# Patient Record
Sex: MALE | Race: WHITE | NOT HISPANIC OR LATINO | Employment: PART TIME | URBAN - METROPOLITAN AREA
[De-identification: names, ages, dates, MRNs, and addresses within clinical notes are randomized per-mention and may not be internally consistent; named-entity substitution may affect disease eponyms.]

---

## 2022-08-21 ENCOUNTER — HOSPITAL ENCOUNTER (EMERGENCY)
Facility: HOSPITAL | Age: 37
Discharge: HOME | End: 2022-08-21
Attending: EMERGENCY MEDICINE
Payer: MEDICAID

## 2022-08-21 VITALS
RESPIRATION RATE: 18 BRPM | WEIGHT: 155 LBS | HEART RATE: 72 BPM | OXYGEN SATURATION: 98 % | DIASTOLIC BLOOD PRESSURE: 82 MMHG | TEMPERATURE: 98 F | SYSTOLIC BLOOD PRESSURE: 124 MMHG

## 2022-08-21 DIAGNOSIS — Z87.442 HISTORY OF KIDNEY STONES: ICD-10-CM

## 2022-08-21 DIAGNOSIS — R10.9 FLANK PAIN: Primary | ICD-10-CM

## 2022-08-21 LAB
BILIRUB UR QL STRIP: NEGATIVE
CLARITY UR: CLEAR
COLOR UR: YELLOW
GLUCOSE UR QL STRIP: NEGATIVE
HGB UR QL STRIP: NEGATIVE
KETONES UR QL STRIP: NEGATIVE
LEUKOCYTE ESTERASE UR QL STRIP: NEGATIVE
NITRITE UR QL STRIP: NEGATIVE
PH UR STRIP: 7 [PH] (ref 5–8)
PROT UR QL STRIP: NEGATIVE
SP GR UR STRIP: 1.02 (ref 1–1.03)
URN SPEC COLLECT METH UR: NORMAL
UROBILINOGEN UR STRIP-ACNC: NEGATIVE EU/DL

## 2022-08-21 PROCEDURE — 99282 EMERGENCY DEPT VISIT SF MDM: CPT

## 2022-08-21 PROCEDURE — 81003 URINALYSIS AUTO W/O SCOPE: CPT | Performed by: NURSE PRACTITIONER

## 2022-08-21 RX ORDER — TAMSULOSIN HYDROCHLORIDE 0.4 MG/1
0.4 CAPSULE ORAL DAILY
Qty: 10 CAPSULE | Refills: 0 | Status: SHIPPED | OUTPATIENT
Start: 2022-08-21 | End: 2023-08-21

## 2022-08-21 NOTE — ED NOTES
At the beginning of the triage process when pt was pulled from front lobby pt was hunched over rocking in pain.  Pt was bracing left lower abdomen and towards end of triage stated he thought he passed the stone, stating a prior hx.  Pt used urinal and specimen sent to lab.  Pt now stating no pain and requesting dc after tests.  erp notified.

## 2022-08-21 NOTE — ED PROVIDER NOTES
Encounter Date: 8/21/2022       History     Chief Complaint   Patient presents with    Flank Pain     37-year-old male presents to the ER with left-sided flank pain that radiates and wraps around to his left mid abdomen described as sharp that was intermittent began last night.  He has a history of kidney stones states his symptoms feel very similar to prior episodes of kidney stones in the past.  Since arriving to the ER he states he went to the restroom and passed what he believes is a stone.  Immediately his pain resolved.  He states he has no further complaints or concerns.  Specifically denies any decreased urinary output.  No hematuria.  No nausea vomiting fever diarrhea constipation or other complaints or concerns.        Review of patient's allergies indicates:  No Known Allergies  No past medical history on file.  No past surgical history on file.  No family history on file.     Review of Systems   Constitutional: Negative for fever.   HENT: Negative for sore throat.    Respiratory: Negative for shortness of breath.    Cardiovascular: Negative for chest pain.   Gastrointestinal: Positive for abdominal pain. Negative for nausea.   Genitourinary: Positive for flank pain. Negative for dysuria.   Musculoskeletal: Negative for back pain.   Skin: Negative for rash.   Allergic/Immunologic: Negative for immunocompromised state.   Neurological: Negative for weakness.   Hematological: Does not bruise/bleed easily.   All other systems reviewed and are negative.      Physical Exam     Initial Vitals   BP Pulse Resp Temp SpO2   08/21/22 1113 08/21/22 1116 08/21/22 1113 08/21/22 1113 08/21/22 1113   125/84 68 (!) 22 98.4 °F (36.9 °C) 98 %      MAP       --                Physical Exam    Nursing note and vitals reviewed.  Constitutional: He appears well-developed and well-nourished. He is not diaphoretic. No distress.   HENT:   Head: Normocephalic and atraumatic.   Right Ear: External ear normal.   Left Ear: External  ear normal.   Nose: Nose normal.   Mouth/Throat: Oropharynx is clear and moist. No oropharyngeal exudate.   Eyes: Conjunctivae are normal. Pupils are equal, round, and reactive to light.   Neck: Neck supple.   Normal range of motion.  Cardiovascular: Normal rate.   No murmur heard.  Pulmonary/Chest: Breath sounds normal. No respiratory distress. He has no wheezes. He has no rhonchi. He has no rales.   Abdominal: Abdomen is soft. Bowel sounds are normal. There is no abdominal tenderness.   Musculoskeletal:         General: No edema. Normal range of motion.      Cervical back: Normal range of motion and neck supple.     Neurological: He is alert and oriented to person, place, and time. He has normal strength. GCS score is 15. GCS eye subscore is 4. GCS verbal subscore is 5. GCS motor subscore is 6.   Skin: Skin is warm and dry. Capillary refill takes less than 2 seconds. No rash noted. No erythema.   Psychiatric: He has a normal mood and affect. Thought content normal.         ED Course   Procedures  Labs Reviewed   URINALYSIS, REFLEX TO URINE CULTURE    Narrative:     Specimen Source->Urine          Imaging Results    None          Medications - No data to display  Medical Decision Making:   Patient's UA is normal.  No evidence of UTI, no significant blood in his urine or microscopic hematuria evident.  His vitals are stable.  He has no reproducible flank pain CVA tenderness or abdominal pain on exam.  He appears no distress.  It is very likely that he did pass a stone and now symptoms resolved.  We will have patient closely monitor his symptoms, and return back to ER for any new or worsening symptoms.  Will also refer to urology as he has a history of multiple kidney stones and passes never seen urologist.  Av they can re-evaluate and look further to see what the underlying cause of his stones are due to modify diet possibly.  He states he would not like any further workup including no CT scan or blood work at this  time and I think that is an and appropriate choice at this time based off his resolution of symptoms.                       Clinical Impression:   Final diagnoses:  [R10.9] Flank pain - resolved (Primary)  [Z87.442] History of kidney stones          ED Disposition Condition    Discharge Stable        ED Prescriptions     Medication Sig Dispense Start Date End Date Auth. Provider    tamsulosin (FLOMAX) 0.4 mg Cap Take 1 capsule (0.4 mg total) by mouth once daily. 10 capsule 8/21/2022 8/21/2023 Jasmine Barkley NP        Follow-up Information     Follow up With Specialties Details Why Contact Info Additional Information    Rosmery Andersen MD Urology Schedule an appointment as soon as possible for a visit in 3 days for ER visit follow up and re-evaluation [address] [place] [address]   [address]  [facility] [address]  [phone]       [facility] - Emergency Dept Emergency Medicine Go to  As needed, If symptoms worsen [address] [facility] [address] [phone] 1st floor           Jasmine Barkley NP  08/21/22 3957

## 2022-08-21 NOTE — Clinical Note
"Vishal Mistry (Kristopher) was seen and treated in our emergency department on 8/21/2022.  He may return to work on 08/22/2022.       If you have any questions or concerns, please don't hesitate to call.      Jasmine Barkley NP"

## 2024-01-08 ENCOUNTER — OFFICE VISIT (OUTPATIENT)
Dept: URGENT CARE | Facility: CLINIC | Age: 39
End: 2024-01-08
Payer: MEDICAID

## 2024-01-08 VITALS
WEIGHT: 164.19 LBS | TEMPERATURE: 98 F | RESPIRATION RATE: 16 BRPM | SYSTOLIC BLOOD PRESSURE: 143 MMHG | HEART RATE: 80 BPM | OXYGEN SATURATION: 98 % | DIASTOLIC BLOOD PRESSURE: 92 MMHG

## 2024-01-08 DIAGNOSIS — L24.7 CONTACT DERMATITIS AND ECZEMA DUE TO PLANT: Primary | ICD-10-CM

## 2024-01-08 PROCEDURE — 99214 OFFICE O/P EST MOD 30 MIN: CPT | Mod: S$GLB,,,

## 2024-01-08 RX ORDER — HYDROCORTISONE 1 %
CREAM (GRAM) TOPICAL 2 TIMES DAILY
Qty: 30 G | Refills: 0 | Status: SHIPPED | OUTPATIENT
Start: 2024-01-08

## 2024-01-08 RX ORDER — CETIRIZINE HYDROCHLORIDE 10 MG/1
10 TABLET ORAL 2 TIMES DAILY
Qty: 20 TABLET | Refills: 0 | Status: SHIPPED | OUTPATIENT
Start: 2024-01-08 | End: 2024-01-18

## 2024-01-08 RX ORDER — DEXAMETHASONE SODIUM PHOSPHATE 4 MG/ML
4 INJECTION, SOLUTION INTRA-ARTICULAR; INTRALESIONAL; INTRAMUSCULAR; INTRAVENOUS; SOFT TISSUE
Status: COMPLETED | OUTPATIENT
Start: 2024-01-08 | End: 2024-01-08

## 2024-01-08 RX ORDER — FAMOTIDINE 20 MG/1
20 TABLET, FILM COATED ORAL 2 TIMES DAILY
Qty: 20 TABLET | Refills: 0 | Status: SHIPPED | OUTPATIENT
Start: 2024-01-08 | End: 2024-01-18

## 2024-01-08 RX ADMIN — DEXAMETHASONE SODIUM PHOSPHATE 4 MG: 4 INJECTION, SOLUTION INTRA-ARTICULAR; INTRALESIONAL; INTRAMUSCULAR; INTRAVENOUS; SOFT TISSUE at 01:01

## 2024-01-08 NOTE — PATIENT INSTRUCTIONS
Follow up in clinic or with dermatology if rash persists.     Go to the ER if symptoms worsen     Use cream with caution around eye as it is dangerous to get steroid cream in your eye.

## 2024-01-08 NOTE — PROGRESS NOTES
Subjective:      Patient ID: Vishal Mistry is a 38 y.o. male.    Vitals:  weight is 74.5 kg (164 lb 3.2 oz). His oral temperature is 97.9 °F (36.6 °C). His blood pressure is 143/92 (abnormal) and his pulse is 80. His respiration is 16 and oxygen saturation is 98%.     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  He began to see a rash on the back of his neck, on his forehead, above his left eye, on the lower left abdomen, and in the groin area. He has tried just cleansing the area which he says normally works well as well as some OTC rash cream. He states that when that method fails a steroid shot generally takes care of the rash. Discussed risk v benefit as well as treatment with 10 days of pepcid, zyrtec, and low potency steroid cream.     Rash  This is a new problem. The current episode started in the past 7 days (saturday night). The affected locations include the head, face, neck, right arm and genitalia. Treatments tried: zanfel cream. The treatment provided no relief.       Constitution: Negative.   HENT: Negative.     Neck: neck negative.   Cardiovascular: Negative.    Respiratory: Negative.     Gastrointestinal: Negative.    Genitourinary: Negative.    Musculoskeletal: Negative.    Skin:  Positive for rash and erythema.   Allergic/Immunologic: Positive for environmental allergies and itching.   Neurological: Negative.    Psychiatric/Behavioral: Negative.        Objective:     Physical Exam   Constitutional: He is oriented to person, place, and time. He appears well-developed.   HENT:   Head: Normocephalic and atraumatic. Head is without abrasion, without contusion and without laceration.   Ears:   Right Ear: External ear normal.   Left Ear: External ear normal.   Nose: Nose normal.   Mouth/Throat: Oropharynx is clear and moist and mucous membranes are normal.   Eyes: Conjunctivae, EOM and lids are normal. Pupils are equal, round, and reactive to  light.   Neck: Trachea normal and phonation normal. Neck supple.   Cardiovascular: Normal rate, regular rhythm and normal heart sounds.   Pulmonary/Chest: Effort normal and breath sounds normal. No stridor. No respiratory distress.   Musculoskeletal: Normal range of motion.         General: Normal range of motion.   Neurological: He is alert and oriented to person, place, and time.   Skin: Skin is warm, dry, intact and rash. Capillary refill takes less than 2 seconds. erythema No abrasion, No burn, No bruising and No ecchymosis        Psychiatric: His speech is normal and behavior is normal. Judgment and thought content normal.   Nursing note and vitals reviewed.      Assessment:     1. Contact dermatitis and eczema due to plant        Plan:       Contact dermatitis and eczema due to plant  -     dexAMETHasone injection 4 mg  -     cetirizine (ZYRTEC) 10 MG tablet; Take 1 tablet (10 mg total) by mouth 2 (two) times a day. for 10 days  Dispense: 20 tablet; Refill: 0  -     famotidine (PEPCID) 20 MG tablet; Take 1 tablet (20 mg total) by mouth 2 (two) times daily. for 10 days  Dispense: 20 tablet; Refill: 0  -     hydrocortisone 1 % cream; Apply topically 2 (two) times daily.  Dispense: 30 g; Refill: 0      Discussed medication with patient who acknowledges understanding and is agreeable to POC. Follow up with primary care. Increase fluid intake. Red flags for ER discussed.    Follow up with dermatology

## 2024-01-13 ENCOUNTER — OFFICE VISIT (OUTPATIENT)
Dept: URGENT CARE | Facility: CLINIC | Age: 39
End: 2024-01-13
Payer: MEDICAID

## 2024-01-13 VITALS
WEIGHT: 165 LBS | HEART RATE: 73 BPM | SYSTOLIC BLOOD PRESSURE: 143 MMHG | TEMPERATURE: 98 F | BODY MASS INDEX: 24.44 KG/M2 | RESPIRATION RATE: 18 BRPM | DIASTOLIC BLOOD PRESSURE: 89 MMHG | OXYGEN SATURATION: 98 % | HEIGHT: 69 IN

## 2024-01-13 DIAGNOSIS — L25.9 CONTACT DERMATITIS, UNSPECIFIED CONTACT DERMATITIS TYPE, UNSPECIFIED TRIGGER: Primary | ICD-10-CM

## 2024-01-13 PROCEDURE — 99213 OFFICE O/P EST LOW 20 MIN: CPT | Mod: S$GLB,,, | Performed by: NURSE PRACTITIONER

## 2024-01-13 RX ORDER — DEXAMETHASONE SODIUM PHOSPHATE 4 MG/ML
8 INJECTION, SOLUTION INTRA-ARTICULAR; INTRALESIONAL; INTRAMUSCULAR; INTRAVENOUS; SOFT TISSUE
Status: COMPLETED | OUTPATIENT
Start: 2024-01-13 | End: 2024-01-13

## 2024-01-13 RX ORDER — PREDNISONE 20 MG/1
20 TABLET ORAL 2 TIMES DAILY
Qty: 8 TABLET | Refills: 0 | Status: SHIPPED | OUTPATIENT
Start: 2024-01-14 | End: 2024-01-18

## 2024-01-13 RX ADMIN — DEXAMETHASONE SODIUM PHOSPHATE 8 MG: 4 INJECTION, SOLUTION INTRA-ARTICULAR; INTRALESIONAL; INTRAMUSCULAR; INTRAVENOUS; SOFT TISSUE at 01:01

## 2024-01-13 NOTE — PROGRESS NOTES
"Subjective:      Patient ID: Vishal Mistry is a 38 y.o. male.    Vitals:  height is 5' 9" (1.753 m) and weight is 74.8 kg (165 lb). His temperature is 98.1 °F (36.7 °C). His blood pressure is 143/89 (abnormal) and his pulse is 73. His respiration is 18 and oxygen saturation is 98%.     Chief Complaint: Rash    Pt was here on Monday and got a steroid shot.  Got relief for one day only. Rash is continuing to spread.    States that after the injection he received in the clinic the other day symptoms did significantly improved how ever they have returned with significant itching not relieved by Pepcid and Zyrtec alone.  History of similar occurrences in the past.    Rash  This is a new problem. The current episode started in the past 7 days. The problem has been gradually worsening since onset. The affected locations include the groin, left ear, left upper leg and right upper leg. The rash is characterized by blistering and draining. He was exposed to plant contact. Past treatments include antibiotic cream (steroid shot). The treatment provided mild relief.       Skin:  Positive for rash and erythema.   Allergic/Immunologic: Positive for itching.      Objective:     Physical Exam   Constitutional: He is oriented to person, place, and time.  Non-toxic appearance. He does not appear ill. No distress.   HENT:   Head: Normocephalic and atraumatic.   Nose: Nose normal.   Mouth/Throat: Mucous membranes are moist.   Eyes: Conjunctivae are normal.   Cardiovascular: Normal rate.   Pulmonary/Chest: Effort normal. No respiratory distress.   Abdominal: Normal appearance.   Neurological: no focal deficit. He is alert and oriented to person, place, and time.   Skin: Skin is warm, dry and rash. Capillary refill takes 2 to 3 seconds. erythema No bruising and No lesion         Comments: See attached photos   Psychiatric: His behavior is normal. Mood normal.   Nursing note and vitals reviewed.                Assessment:     1. Contact " dermatitis, unspecified contact dermatitis type, unspecified trigger        Plan:       Contact dermatitis, unspecified contact dermatitis type, unspecified trigger  -     dexAMETHasone injection 8 mg  -     predniSONE (DELTASONE) 20 MG tablet; Take 1 tablet (20 mg total) by mouth 2 (two) times daily. for 4 days  Dispense: 8 tablet; Refill: 0

## 2024-01-13 NOTE — PATIENT INSTRUCTIONS
Start prednisone pills tomorrow and take as prescribed  Continue Pepcid and Zyrtec daily until symptoms have resolved    Personal protection against illness for 1-2 weeks due to lowered immune system from steroid therapy.  Please wear a mask and eye protection around others and wash hands often

## 2024-06-25 DIAGNOSIS — M25.569 PAIN IN JOINT, LOWER LEG: Primary | ICD-10-CM

## 2024-06-25 DIAGNOSIS — R22.42 MASS OF LOWER LEG, LEFT: ICD-10-CM
